# Patient Record
Sex: FEMALE | Race: WHITE | Employment: UNEMPLOYED | URBAN - METROPOLITAN AREA
[De-identification: names, ages, dates, MRNs, and addresses within clinical notes are randomized per-mention and may not be internally consistent; named-entity substitution may affect disease eponyms.]

---

## 2018-02-11 ENCOUNTER — HOSPITAL ENCOUNTER (OUTPATIENT)
Age: 23
Discharge: HOME | End: 2018-02-11
Attending: OBSTETRICS & GYNECOLOGY | Admitting: OBSTETRICS & GYNECOLOGY

## 2018-02-11 VITALS
HEIGHT: 65 IN | SYSTOLIC BLOOD PRESSURE: 117 MMHG | TEMPERATURE: 98.3 F | HEART RATE: 86 BPM | BODY MASS INDEX: 23.82 KG/M2 | WEIGHT: 143 LBS | DIASTOLIC BLOOD PRESSURE: 62 MMHG | RESPIRATION RATE: 16 BRPM

## 2018-02-11 LAB
AMPHETAMINE SCREEN, URINE: NORMAL
BACTERIA: NORMAL /HPF
BARBITURATE SCREEN URINE: NORMAL
BENZODIAZEPINE SCREEN, URINE: NORMAL
BILIRUBIN URINE: NEGATIVE
BLOOD, URINE: NEGATIVE
CANNABINOID SCREEN URINE: NORMAL
CLARITY: ABNORMAL
CLUE CELLS: NORMAL
COCAINE METABOLITE SCREEN URINE: NORMAL
COLOR: YELLOW
EPITHELIAL CELLS, UA: NORMAL /HPF
GLUCOSE URINE: NEGATIVE MG/DL
KETONES, URINE: NEGATIVE MG/DL
LEUKOCYTE ESTERASE, URINE: ABNORMAL
Lab: NORMAL
NITRITE, URINE: NEGATIVE
OPIATE SCREEN URINE: NORMAL
PH UA: 8 (ref 5–9)
PHENCYCLIDINE SCREEN URINE: NORMAL
PROTEIN UA: ABNORMAL MG/DL
RBC UA: NORMAL /HPF (ref 0–2)
SPECIFIC GRAVITY UA: 1.02 (ref 1–1.03)
TRICHOMONAS PREP: NORMAL
TRICHOMONAS VAGINALIS SCREEN: NEGATIVE
UROBILINOGEN, URINE: 1 E.U./DL
WBC UA: NORMAL /HPF (ref 0–5)
YEAST WET PREP: NORMAL

## 2018-02-11 PROCEDURE — 87660 TRICHOMONAS VAGIN DIR PROBE: CPT

## 2018-02-11 PROCEDURE — 6370000000 HC RX 637 (ALT 250 FOR IP): Performed by: OBSTETRICS & GYNECOLOGY

## 2018-02-11 PROCEDURE — 81001 URINALYSIS AUTO W/SCOPE: CPT

## 2018-02-11 PROCEDURE — 99283 EMERGENCY DEPT VISIT LOW MDM: CPT

## 2018-02-11 PROCEDURE — 80307 DRUG TEST PRSMV CHEM ANLYZR: CPT

## 2018-02-11 PROCEDURE — 87070 CULTURE OTHR SPECIMN AEROBIC: CPT

## 2018-02-11 PROCEDURE — 87210 SMEAR WET MOUNT SALINE/INK: CPT

## 2018-02-11 PROCEDURE — 87591 N.GONORRHOEAE DNA AMP PROB: CPT

## 2018-02-11 PROCEDURE — 87491 CHLMYD TRACH DNA AMP PROBE: CPT

## 2018-02-11 PROCEDURE — 99217 PR OBSERVATION CARE DISCHARGE MANAGEMENT: CPT | Performed by: OBSTETRICS & GYNECOLOGY

## 2018-02-11 PROCEDURE — 59025 FETAL NON-STRESS TEST: CPT

## 2018-02-11 RX ORDER — BUTALBITAL, ACETAMINOPHEN AND CAFFEINE 300; 40; 50 MG/1; MG/1; MG/1
1 CAPSULE ORAL EVERY 6 HOURS PRN
Qty: 30 CAPSULE | Refills: 0 | Status: SHIPPED | OUTPATIENT
Start: 2018-02-11

## 2018-02-11 RX ORDER — PROMETHAZINE HYDROCHLORIDE 25 MG/1
25 TABLET ORAL EVERY 6 HOURS PRN
Qty: 30 TABLET | Refills: 0 | Status: SHIPPED | OUTPATIENT
Start: 2018-02-11 | End: 2018-02-18

## 2018-02-11 RX ORDER — BUTALBITAL, ACETAMINOPHEN AND CAFFEINE 300; 40; 50 MG/1; MG/1; MG/1
1 CAPSULE ORAL EVERY 4 HOURS PRN
Status: DISCONTINUED | OUTPATIENT
Start: 2018-02-11 | End: 2018-02-11 | Stop reason: HOSPADM

## 2018-02-11 RX ADMIN — BUTALBITAL, ACETAMINOPHEN AND CAFFEINE 1 CAPSULE: 300; 40; 50 CAPSULE ORAL at 14:15

## 2018-02-11 NOTE — DISCHARGE SUMMARY
Pt was seen in triage for h/a and cramping. Headache resolved with Fioricet and genital cultures were negative, Her exam was normal and vitals stable. She is discharged to home with Fioricet.

## 2018-02-13 LAB — GENITAL CULTURE, ROUTINE: NORMAL

## 2018-02-15 LAB
C TRACH DNA GENITAL QL NAA+PROBE: NEGATIVE
N. GONORRHOEAE DNA: NEGATIVE

## 2018-03-05 ENCOUNTER — OFFICE VISIT (OUTPATIENT)
Dept: OBGYN CLINIC | Age: 23
End: 2018-03-05
Payer: MEDICAID

## 2018-03-05 VITALS
SYSTOLIC BLOOD PRESSURE: 110 MMHG | WEIGHT: 149 LBS | HEIGHT: 65 IN | HEART RATE: 88 BPM | RESPIRATION RATE: 16 BRPM | DIASTOLIC BLOOD PRESSURE: 60 MMHG | BODY MASS INDEX: 24.83 KG/M2

## 2018-03-05 DIAGNOSIS — F41.1 GAD (GENERALIZED ANXIETY DISORDER): ICD-10-CM

## 2018-03-05 DIAGNOSIS — N91.2 AMENORRHEA: ICD-10-CM

## 2018-03-05 DIAGNOSIS — K04.7 TOOTH INFECTION: ICD-10-CM

## 2018-03-05 DIAGNOSIS — H92.09 EARACHE: ICD-10-CM

## 2018-03-05 DIAGNOSIS — N91.2 AMENORRHEA: Primary | ICD-10-CM

## 2018-03-05 DIAGNOSIS — F17.200 SMOKING: ICD-10-CM

## 2018-03-05 LAB
BACTERIA: ABNORMAL /HPF
BILIRUBIN URINE: NEGATIVE
BLOOD, URINE: NEGATIVE
CLARITY: ABNORMAL
COLOR: YELLOW
CONTROL: NORMAL
EPITHELIAL CELLS, UA: ABNORMAL /HPF
GLUCOSE URINE: NEGATIVE MG/DL
KETONES, URINE: NEGATIVE MG/DL
LEUKOCYTE ESTERASE, URINE: ABNORMAL
NITRITE, URINE: NEGATIVE
PH UA: 7 (ref 5–9)
PREGNANCY TEST URINE, POC: POSITIVE
PROTEIN UA: NEGATIVE MG/DL
RBC UA: ABNORMAL /HPF (ref 0–2)
SPECIFIC GRAVITY UA: 1.01 (ref 1–1.03)
UROBILINOGEN, URINE: 0.2 E.U./DL
WBC UA: ABNORMAL /HPF (ref 0–5)

## 2018-03-05 PROCEDURE — 81025 URINE PREGNANCY TEST: CPT | Performed by: OBSTETRICS & GYNECOLOGY

## 2018-03-05 PROCEDURE — 99214 OFFICE O/P EST MOD 30 MIN: CPT | Performed by: OBSTETRICS & GYNECOLOGY

## 2018-03-05 RX ORDER — AMOXICILLIN 500 MG/1
500 CAPSULE ORAL 4 TIMES DAILY
Qty: 40 CAPSULE | Refills: 0 | Status: SHIPPED | OUTPATIENT
Start: 2018-03-05 | End: 2018-03-15

## 2018-03-05 RX ORDER — PNV NO.95/FERROUS FUM/FOLIC AC 28MG-0.8MG
1 TABLET ORAL 2 TIMES DAILY
Qty: 60 TABLET | Refills: 2 | Status: SHIPPED | OUTPATIENT
Start: 2018-03-05

## 2018-03-05 RX ORDER — METOCLOPRAMIDE 10 MG/1
10 TABLET ORAL EVERY 6 HOURS PRN
Qty: 30 TABLET | Refills: 1 | Status: SHIPPED | OUTPATIENT
Start: 2018-03-05

## 2018-03-05 RX ORDER — ESCITALOPRAM OXALATE 10 MG/1
10 TABLET ORAL DAILY
Qty: 30 TABLET | Refills: 3 | Status: SHIPPED | OUTPATIENT
Start: 2018-03-05

## 2018-03-05 NOTE — PROGRESS NOTES
pelvic support, no significant cystocele or rectocele  Perineum: No fissures, lesions or leukoplakia  Urethra: Normal  Vagina: Moist, pink, supple, no lesions, no abnormal discharge  Cervix: Firm, nontender, no lesions  Uterus: firm, mobile, nontender, no masses or irregularities      Assessment:   1. Amenorrhea    2. NORMA (generalized anxiety disorder)    3. Earache    4. Tooth infection    5. Smoking        Plan:   Orders Placed This Encounter   Procedures    C. Trachomatis / N. Gonorrhoeae, DNA     Standing Status:   Future     Standing Expiration Date:   3/5/2019    Urine Culture     Standing Status:   Future     Standing Expiration Date:   3/5/2019     Order Specific Question:   Specify (ex-cath, midstream, cysto, etc)? Answer:   midstream    US OB Detail Fetal Anatomy Single or 1st     Standing Status:   Future     Standing Expiration Date:   3/5/2019     Order Specific Question:   Reason for exam:     Answer:   anatomy scan    CBC Auto Differential     Standing Status:   Future     Standing Expiration Date:   3/5/2019    HCG, Quantitative, Pregnancy     Standing Status:   Future     Standing Expiration Date:   3/5/2019    Hemoglobinopathy Eval (Electrophoresis)     Standing Status:   Future     Standing Expiration Date:   3/5/2019    Hepatitis B Surface Antigen     Standing Status:   Future     Standing Expiration Date:   3/5/2019    HIV-1,-2 w/Reflex to HIV-1 Western Blot     Standing Status:   Future     Standing Expiration Date:   3/5/2019    PAP SMEAR     Standing Status:   Future     Standing Expiration Date:   3/5/2019     Order Specific Question:   Collection Type     Answer: Thin Prep     Order Specific Question:   Prior Abnormal Pap Test     Answer:   No     Order Specific Question:   Screening or Diagnostic     Answer:   Screening     Order Specific Question:   HPV Requested?      Answer:   HPV Co-Test     Order Specific Question:   High Risk Patient     Answer:   N/A    RPR Reflex to miscarriage is when a pregnancy ends before a woman has been pregnant for 20 weeks. ?Stillbirth - A stillbirth is when a baby dies before it is born, in the second half of pregnancy (after 20 weeks). How can smoking during pregnancy affect my baby? -- Babies whose mothers smoke during pregnancy are more likely than babies whose mothers do not smoke to:  ? Be premature or be born too small - Babies who are premature or small are more likely to have problems with feeding, sleeping, breathing, seeing, and hearing. ?Die suddenly for no known reason before the age of 1 year (this is called \"sudden infant death syndrome\" or \"SIDS\")    Patient was seen with total face to face time of 25 minutes. More than 50% of this visit was counseling and education regarding The primary encounter diagnosis was Amenorrhea. Diagnoses of NORMA (generalized anxiety disorder), Earache, Tooth infection, and Smoking were also pertinent to this visit. and Amenorrhea (amenorrhea last menstural period 9-24)   as well as  counseling on preventative health maintenance follow-up. Initial labs drawn. Prenatal vitamins. Problem list reviewed and updated. Counseled about QUAD/ NIPT  Role of ultrasound in pregnancy discussed  Follow-up in 2 weeks  Early 1 hr OGTT - indicated ordered  Hep C screen indicated : no  FLU- not indicated   TDAP- pending . Christopher Joyner M.D., F.A.C.O. G

## 2018-03-07 ENCOUNTER — TELEPHONE (OUTPATIENT)
Dept: OBGYN CLINIC | Age: 23
End: 2018-03-07

## 2018-03-07 LAB
AMPHETAMINE SCREEN, URINE: NEGATIVE NG/ML
BARBITURATE SCREEN URINE: NEGATIVE NG/ML
BENZODIAZEPINE SCREEN, URINE: NEGATIVE NG/ML
CANNABINOID SCREEN URINE: NEGATIVE NG/ML
COCAINE METABOLITE SCREEN URINE: NEGATIVE NG/ML
CREATININE URINE: 51.9 MG/DL (ref 20–400)
Lab: NORMAL
MDMA URINE: NEGATIVE NG/ML
METHADONE SCREEN, URINE: NEGATIVE NG/ML
OPIATE SCREEN URINE: NEGATIVE NG/ML
OXYCODONE SCREEN URINE: NEGATIVE NG/ML
PHENCYCLIDINE SCREEN URINE: NEGATIVE NG/ML
PROPOXYPHENE SCREEN: NEGATIVE NG/ML
URINE CULTURE, ROUTINE: NORMAL

## 2018-03-07 RX ORDER — AMOXICILLIN 250 MG/5ML
500 POWDER, FOR SUSPENSION ORAL 4 TIMES DAILY
Qty: 400 ML | Refills: 0 | Status: SHIPPED | OUTPATIENT
Start: 2018-03-07 | End: 2018-03-17

## 2018-03-09 LAB
C. TRACHOMATIS DNA ,URINE: NEGATIVE
N. GONORRHOEAE DNA, URINE: NEGATIVE

## 2018-03-19 ENCOUNTER — HOSPITAL ENCOUNTER (OUTPATIENT)
Dept: ULTRASOUND IMAGING | Age: 23
Discharge: HOME | End: 2018-03-21

## 2018-03-19 DIAGNOSIS — N91.2 AMENORRHEA: ICD-10-CM

## 2018-03-19 LAB
ABO/RH: NORMAL
ANTIBODY SCREEN: NORMAL
BASOPHILS ABSOLUTE: 0 K/UL (ref 0–0.2)
BASOPHILS RELATIVE PERCENT: 0.3 %
EOSINOPHILS ABSOLUTE: 0.1 K/UL (ref 0–0.7)
EOSINOPHILS RELATIVE PERCENT: 0.9 %
GLUCOSE, 1HR PP: 107 MG/DL (ref 60–140)
GONADOTROPIN, CHORIONIC (HCG) QUANT: NORMAL MIU/ML
HCT VFR BLD CALC: 35.5 % (ref 37–47)
HEMOGLOBIN: 12.3 G/DL (ref 12–16)
HEPATITIS B SURFACE ANTIGEN INTERPRETATION: NORMAL
LYMPHOCYTES ABSOLUTE: 2 K/UL (ref 1–4.8)
LYMPHOCYTES RELATIVE PERCENT: 18.4 %
MCH RBC QN AUTO: 29.3 PG (ref 27–31.3)
MCHC RBC AUTO-ENTMCNC: 34.6 % (ref 33–37)
MCV RBC AUTO: 84.8 FL (ref 82–100)
MONOCYTES ABSOLUTE: 0.5 K/UL (ref 0.2–0.8)
MONOCYTES RELATIVE PERCENT: 4.9 %
NEUTROPHILS ABSOLUTE: 8.1 K/UL (ref 1.4–6.5)
NEUTROPHILS RELATIVE PERCENT: 75.5 %
PDW BLD-RTO: 13.1 % (ref 11.5–14.5)
PLATELET # BLD: 213 K/UL (ref 130–400)
RBC # BLD: 4.18 M/UL (ref 4.2–5.4)
RUBELLA ANTIBODY IGG: 21.2 IU/ML
WBC # BLD: 10.7 K/UL (ref 4.8–10.8)

## 2018-03-19 PROCEDURE — 76805 OB US >/= 14 WKS SNGL FETUS: CPT

## 2018-03-20 LAB — RPR: NORMAL

## 2018-03-21 LAB
HEMOGLOBIN A-1 QUANTITATION: 96.7 % (ref 95–97.9)
HEMOGLOBIN A2 QUANTITATION: 3.1 % (ref 2–3.5)
HEMOGLOBIN C QUANTITATION: 0 % (ref 0–0)
HEMOGLOBIN E QUANTITATION: 0 % (ref 0–0)
HEMOGLOBIN ELECTROPHORESIS: NORMAL
HEMOGLOBIN EVALUATION: NORMAL
HEMOGLOBIN F QUANTITATION: 0.2 % (ref 0–2.1)
HEMOGLOBIN OTHER: 0 % (ref 0–0)
HEMOGLOBIN S QUANTITATION: 0 % (ref 0–0)
HIV-1 AND HIV-2 ANTIBODIES: NEGATIVE
SICKLE CELL: NORMAL
VZV IGG SER QL IA: 654 IV

## 2018-03-28 LAB
EER NON INVASIVE PRENATAL ANEUPLOIDY: NORMAL
FETAL FRACTION: 23.4
FETAL GENDER: NORMAL
GESTATIONAL AGE (DAYS): 5
GESTATIONAL AGE(WEEKS): 24
Lab: NORMAL
MATERNAL WEIGHT: 149
MONOSOMY X: NORMAL
REPORT FETUS GENDER: YES
TRIPLOIDY (VANISHING TWIN): NORMAL
TRISOMY 13 RISK: NORMAL
TRISOMY 18 RISK ASSESSMENT: NORMAL
TRISOMY 21 RISK: NORMAL

## 2018-04-05 LAB
CARRIER SCREEN, 4 CONDITIONS: NORMAL
EER CARRIER SCREEN, 4 CONDITIONS: NORMAL

## 2018-04-16 ENCOUNTER — TELEPHONE (OUTPATIENT)
Dept: OBGYN CLINIC | Age: 23
End: 2018-04-16

## 2018-04-18 ENCOUNTER — HOSPITAL ENCOUNTER (OUTPATIENT)
Age: 23
Discharge: HOME | End: 2018-04-18
Attending: OBSTETRICS & GYNECOLOGY | Admitting: OBSTETRICS & GYNECOLOGY
Payer: MEDICAID

## 2018-04-18 VITALS
RESPIRATION RATE: 18 BRPM | DIASTOLIC BLOOD PRESSURE: 73 MMHG | BODY MASS INDEX: 26.04 KG/M2 | TEMPERATURE: 98 F | HEART RATE: 110 BPM | WEIGHT: 156.5 LBS | SYSTOLIC BLOOD PRESSURE: 119 MMHG

## 2018-04-18 LAB
AMPHETAMINE SCREEN, URINE: NORMAL
BARBITURATE SCREEN URINE: NORMAL
BENZODIAZEPINE SCREEN, URINE: NORMAL
BILIRUBIN URINE: NEGATIVE
BLOOD, URINE: NEGATIVE
CANNABINOID SCREEN URINE: NORMAL
CLARITY: ABNORMAL
COCAINE METABOLITE SCREEN URINE: NORMAL
COLOR: YELLOW
GLUCOSE URINE: NEGATIVE MG/DL
KETONES, URINE: NEGATIVE MG/DL
LEUKOCYTE ESTERASE, URINE: NEGATIVE
Lab: NORMAL
NITRITE, URINE: NEGATIVE
OPIATE SCREEN URINE: NORMAL
PH UA: 7 (ref 5–9)
PHENCYCLIDINE SCREEN URINE: NORMAL
PROTEIN UA: NEGATIVE MG/DL
SPECIFIC GRAVITY UA: 1.02 (ref 1–1.03)
UROBILINOGEN, URINE: 1 E.U./DL

## 2018-04-18 PROCEDURE — 80307 DRUG TEST PRSMV CHEM ANLYZR: CPT

## 2018-04-18 PROCEDURE — 99220 PR INITIAL OBSERVATION CARE/DAY 70 MINUTES: CPT | Performed by: OBSTETRICS & GYNECOLOGY

## 2018-04-18 PROCEDURE — 59025 FETAL NON-STRESS TEST: CPT | Performed by: OBSTETRICS & GYNECOLOGY

## 2018-04-18 PROCEDURE — 96372 THER/PROPH/DIAG INJ SC/IM: CPT

## 2018-04-18 PROCEDURE — 81003 URINALYSIS AUTO W/O SCOPE: CPT

## 2018-04-18 PROCEDURE — 6360000002 HC RX W HCPCS: Performed by: OBSTETRICS & GYNECOLOGY

## 2018-04-18 PROCEDURE — 84112 EVAL AMNIOTIC FLUID PROTEIN: CPT

## 2018-04-18 PROCEDURE — 99283 EMERGENCY DEPT VISIT LOW MDM: CPT

## 2018-04-18 RX ADMIN — HUMAN RHO(D) IMMUNE GLOBULIN 300 MCG: 1500 SOLUTION INTRAMUSCULAR; INTRAVENOUS at 19:49

## 2018-10-11 ENCOUNTER — HOSPITAL ENCOUNTER (EMERGENCY)
Age: 23
Discharge: HOME | End: 2018-10-11
Attending: EMERGENCY MEDICINE
Payer: MEDICAID

## 2018-10-11 VITALS
TEMPERATURE: 97.3 F | OXYGEN SATURATION: 98 % | DIASTOLIC BLOOD PRESSURE: 82 MMHG | RESPIRATION RATE: 16 BRPM | SYSTOLIC BLOOD PRESSURE: 111 MMHG | HEART RATE: 62 BPM

## 2018-10-11 DIAGNOSIS — K08.89 PAIN, DENTAL: Primary | ICD-10-CM

## 2018-10-11 PROCEDURE — 6370000000 HC RX 637 (ALT 250 FOR IP): Performed by: EMERGENCY MEDICINE

## 2018-10-11 PROCEDURE — 99282 EMERGENCY DEPT VISIT SF MDM: CPT

## 2018-10-11 RX ORDER — ACETAMINOPHEN AND CODEINE PHOSPHATE 60; 300 MG/1; MG/1
1 TABLET ORAL ONCE
Status: DISCONTINUED | OUTPATIENT
Start: 2018-10-11 | End: 2018-10-11

## 2018-10-11 RX ORDER — PENICILLIN V POTASSIUM 500 MG/1
500 TABLET ORAL 4 TIMES DAILY
Qty: 28 TABLET | Refills: 0 | Status: SHIPPED | OUTPATIENT
Start: 2018-10-11 | End: 2018-10-18

## 2018-10-11 RX ORDER — ALPRAZOLAM 1 MG/1
1 TABLET ORAL NIGHTLY PRN
COMMUNITY

## 2018-10-11 RX ORDER — ACETAMINOPHEN AND CODEINE PHOSPHATE 300; 30 MG/1; MG/1
1 TABLET ORAL 3 TIMES DAILY PRN
Qty: 10 TABLET | Refills: 0 | Status: SHIPPED | OUTPATIENT
Start: 2018-10-11 | End: 2018-10-14

## 2018-10-11 RX ORDER — ACETAMINOPHEN AND CODEINE PHOSPHATE 300; 30 MG/1; MG/1
1 TABLET ORAL ONCE
Status: COMPLETED | OUTPATIENT
Start: 2018-10-11 | End: 2018-10-11

## 2018-10-11 RX ADMIN — ACETAMINOPHEN AND CODEINE PHOSPHATE 1 TABLET: 300; 30 TABLET ORAL at 12:31

## 2018-10-11 ASSESSMENT — PAIN DESCRIPTION - PAIN TYPE: TYPE: ACUTE PAIN

## 2018-10-11 ASSESSMENT — PAIN SCALES - GENERAL
PAINLEVEL_OUTOF10: 7
PAINLEVEL_OUTOF10: 7

## 2018-10-11 ASSESSMENT — ENCOUNTER SYMPTOMS
SORE THROAT: 0
SHORTNESS OF BREATH: 0
VOMITING: 0
NAUSEA: 0
EYE PAIN: 0
ABDOMINAL PAIN: 0
CHEST TIGHTNESS: 0

## 2018-10-11 ASSESSMENT — PAIN DESCRIPTION - LOCATION: LOCATION: TEETH
